# Patient Record
Sex: MALE | Race: BLACK OR AFRICAN AMERICAN | NOT HISPANIC OR LATINO | URBAN - METROPOLITAN AREA
[De-identification: names, ages, dates, MRNs, and addresses within clinical notes are randomized per-mention and may not be internally consistent; named-entity substitution may affect disease eponyms.]

---

## 2018-09-28 ENCOUNTER — OFFICE VISIT (OUTPATIENT)
Dept: FAMILY MEDICINE CLINIC | Facility: CLINIC | Age: 33
End: 2018-09-28

## 2018-09-28 VITALS
HEIGHT: 67 IN | OXYGEN SATURATION: 99 % | SYSTOLIC BLOOD PRESSURE: 112 MMHG | HEART RATE: 70 BPM | WEIGHT: 178 LBS | DIASTOLIC BLOOD PRESSURE: 76 MMHG | BODY MASS INDEX: 27.94 KG/M2

## 2018-09-28 DIAGNOSIS — R55 VASOVAGAL SYNCOPE: Primary | ICD-10-CM

## 2018-09-28 DIAGNOSIS — R94.31 ABNORMAL ELECTROCARDIOGRAM: ICD-10-CM

## 2018-09-28 PROBLEM — E55.9 VITAMIN D DEFICIENCY: Status: ACTIVE | Noted: 2018-09-28

## 2018-09-28 PROCEDURE — 99213 OFFICE O/P EST LOW 20 MIN: CPT | Performed by: NURSE PRACTITIONER

## 2018-09-28 PROCEDURE — 93000 ELECTROCARDIOGRAM COMPLETE: CPT | Performed by: NURSE PRACTITIONER

## 2018-09-28 RX ORDER — LORATADINE 10 MG/1
10 TABLET ORAL
COMMUNITY

## 2018-09-28 NOTE — PROGRESS NOTES
Procedure   Procedures     Adult ECG Report     Name: Hernán Bolanos   Age: 33 y.o.   Gender: male       Rate: 63   Rhythm: normal sinus rhythm   QRS Axis: nml   DE Interval: 149   QRS Duration: 88   QTc: 373   Voltages: .   Conduction Disturbances: Diffuse ST elevation without reciprocal changes, not acute   Other Abnormalities: none     Narrative Interpretation: Abnormal EKG however this is patient's baseline.  See office note 12/15/2016 during complete physical exam routine EKG with diffuse ST elevation.  Unable to locate previous EKG scan, requesting medical assistant to search all scripts.  Patient's without chest pain or equivalent feels fine.  Recent vasovagal syncope episode indication.

## 2018-09-28 NOTE — PROGRESS NOTES
Subjective   Hernán Bolanos is a 33 y.o. male.     Last weekend patient was out at a restaurant he had not eaten since that morning which was refined carbohydrates  But he was eating dinner already  He became nauseated and weak felt like he was going to pass out without chest pain shortness of breath.  His wife asked him to go to the restroom  He got up on his own and was walking became weaker and then passed out unconscious  No seizure no head injury no incontinence of bowel or bladder  After a little bit he was up felt weak again without chest pain or pressure  But had no slurred speech no vision loss no chest pain felt better but took quite a bit for complete recovery    He has no history of exertional chest pain or signs and symptoms of angina  No paresthesias weakness or other problems  Feels fine presently      He had one similar occurrence last year after having flu  Although at time he had fever cough congestion sitting on a couch and had a near syncope episode after standing  Which is highly suggestive of orthostatic near syncope    No history of heart problems  He has abnormal EKG from 2016 for which I ordered echocardiogram  His echocardiogram is normal however I cannot find to relocate the scan of his EKG    Past medical history  Healthy    Social history  Smoke cigars  Does not abuse alcohol  Employed    No strong family history of heart problems         The following portions of the patient's history were reviewed and updated as appropriate: allergies, current medications, past family history, past medical history, past social history and problem list.    Review of Systems   Cardiovascular: Negative for chest pain, palpitations and leg swelling.        Isolated syncope episode  Without chest pain shortness of breath  Presently feels fine no exertional chest pain   All other systems reviewed and are negative.      Objective   Physical Exam   Constitutional: He is oriented to person, place, and time. He  appears well-developed and well-nourished. No distress.   Pleasant appears well   HENT:   Head: Normocephalic and atraumatic.   Nose: Nose normal.   Mouth/Throat: Oropharynx is clear and moist.   Eyes: Pupils are equal, round, and reactive to light. Conjunctivae are normal. No scleral icterus.   Neck: Neck supple. No JVD present. No thyromegaly present.   Cardiovascular: Normal rate, regular rhythm and normal heart sounds.  Exam reveals no gallop and no friction rub.    No murmur heard.  Pulmonary/Chest: Effort normal and breath sounds normal. No respiratory distress. He has no wheezes. He has no rales.   Abdominal: Soft. Bowel sounds are normal. He exhibits no distension and no mass. There is no tenderness. There is no guarding. No hernia.   Musculoskeletal: He exhibits no edema or tenderness.   Lymphadenopathy:     He has no cervical adenopathy.   Neurological: He is alert and oriented to person, place, and time. He has normal reflexes.   Skin: Skin is warm and dry. No rash noted. He is not diaphoretic. No erythema.   Psychiatric: He has a normal mood and affect. His behavior is normal. Judgment and thought content normal.   Vitals reviewed.        Assessment/Plan   Hernán was seen today for annual exam.    Diagnoses and all orders for this visit:    Vasovagal syncope  -     ECG 12 Lead  -     TSH Rfx On Abnormal To Free T4  -     Comprehensive Metabolic Panel  -     CBC & Differential  -     Lipid Panel With LDL / HDL Ratio  -     Urinalysis With Microscopic If Indicated (No Culture) - Urine, Clean Catch    Abnormal electrocardiogram  Comments:  Diffuse ST elevation without reciprocal changes, originally noted on EKG 12/15/2016 see office note, unable to locate original scan                  Patient is describing vasovagal  No incontinence or witnessed seizure  Seizure unlikely  He is without any chest pain complaints  Echocardiogram 2016 is normal    EKG diffuse ST elevation without reciprocal changes  He is  without chest pain  History being an athlete   During complete physical exam see office note 12/15/2016   Documented ST elevation on his EKG resulting in echocardiogram which was normal   Unable to locate the EKG scan I requested my medical assistant to search all scripts for this EKG     Repolarization ?    Patient feels fine normal exam without chest pain   If he has chest pain shortness of breath or syncope episode   He should go to emergency room   Push fluids   Avoid skipping meals   He's not been eating as much over the last couple months with a new job   Working 2 jobs I suspect the fact that he did not eat most a day and my should do with his syncope episode     Referred to cardiology       James Epley NP

## 2018-09-28 NOTE — PATIENT INSTRUCTIONS
Vasovagal Syncope, Adult  Syncope, which is commonly known as fainting or passing out, is a temporary loss of consciousness. It occurs when the blood flow to the brain is reduced. Vasovagal syncope, also called neurocardiogenic syncope, is a fainting spell that happens when blood flow to the brain is reduced because of a sudden drop in heart rate and blood pressure.  Vasovagal syncope is usually harmless. However, you can get injured if you fall during a fainting spell.  What are the causes?  This condition is caused by a drop in heart rate and blood pressure, usually in response to a trigger. Many things and situations can trigger an episode, including:  · Pain.  · Fear.  · The sight of blood. This may occur during medical procedures, such as when blood is being drawn from a vein.  · Common activities, such as coughing, swallowing, stretching, or going to the bathroom.  · Emotional stress.  · Being in a confined space.  · Prolonged standing, especially in a warm environment.  · Lack of sleep or rest.  · Not eating for a long time.  · Not drinking enough liquids.  · Recent illness.  · Drinking alcohol.  · Taking drugs that affect blood pressure, such as marijuana, cocaine, opiates, or inhalants.    What are the signs or symptoms?  Before a fainting episode, you may:  · Feel dizzy or light-headed.  · Become pale.  · Sense that you are going to faint.  · Feel like the room is spinning.  · Only see directly ahead (tunnel vision).  · Feel sick to your stomach (nauseous).  · See spots.  · Slowly lose vision.  · Hear ringing in your ears.  · Have a headache.  · Feel warm and sweaty.  · Feel a sensation of pins and needles.    During the fainting spell, you may twitch or make jerky movements. Fainting spells usually last no longer than a few minutes before you wake up. If you get up too quickly before your body can recover, you may faint again.  How is this diagnosed?  This condition is diagnosed based on your symptoms,  your medical history, and a physical exam. Tests may be done to rule out other causes of fainting. Tests may include:  · Blood tests.  · Heart tests, such as an electrocardiogram (ECG), echocardiogram, or electrophysiology study.  · A test to check your response to changes in position (tilt table test).    How is this treated?  Usually, treatment is not needed for this condition. Your health care provider may suggest ways to help prevent fainting episodes. These may include:  · Drinking additional fluids if you are exposed to a trigger.  · Sitting or lying down if you notice signs that an episode is coming.    If your fainting spells continue, your health care provider may recommend that you:  · Take medicines to prevent fainting or to help reduce further episodes of fainting.  · Do certain exercises.  · Wear compression stockings.  · Have surgery to place a pacemaker in your body (rare).    Follow these instructions at home:  · Learn to identify the signs that an episode is coming.  · Sit or lie down at the first sign of a fainting spell. If you sit down, put your head down between your legs. If you lie down, swing your legs up in the air to increase blood flow to the brain.  · Avoid hot tubs and saunas.  · Avoid standing for a long time. If you have to stand for a long time, try:  ? Crossing your legs.  ? Flexing and stretching your leg muscles.  ? Squatting.  ? Moving your legs.  ? Bending over.  · Drink enough fluid to keep your urine clear or pale yellow.  · Make changes to your diet that your health care provider recommends. You may be told to:  ? Avoid caffeine.  ? Eat more salt.  · Take over-the-counter and prescription medicines only as told by your health care provider.  Contact a health care provider if:  · You continue to have fainting spells despite treatment.  · You faint more often despite treatment.  · You lose consciousness for more than a few minutes.  · You faint during or after exercising or  after being startled.  · You have twitching or jerky movements for longer than a few seconds during a fainting spell.  · You have an episode of twitching or jerky movements without fainting.  Get help right away if:  · A fainting spell leads to an injury or bleeding.  · You have new symptoms that occur with the fainting spells, such as:  ? Shortness of breath.  ? Chest pain.  ? Irregular heartbeat.  · You twitch or make jerky movements for more than 5 minutes.  · You twitch or make jerky movements during more than one fainting spell.  This information is not intended to replace advice given to you by your health care provider. Make sure you discuss any questions you have with your health care provider.  Document Released: 12/04/2013 Document Revised: 05/31/2017 Document Reviewed: 10/15/2016  [employer] Patient Education © 2018 [employer]

## 2018-10-12 ENCOUNTER — TELEPHONE (OUTPATIENT)
Dept: FAMILY MEDICINE CLINIC | Facility: CLINIC | Age: 33
End: 2018-10-12

## 2018-10-12 NOTE — TELEPHONE ENCOUNTER
PRIMITIVO PUT IN LAB ORDERS ON 9/28 DOES PT STILL NEED THESE?  THANK YOU  THIS IS IN OVERDUE TASKS